# Patient Record
Sex: MALE
[De-identification: names, ages, dates, MRNs, and addresses within clinical notes are randomized per-mention and may not be internally consistent; named-entity substitution may affect disease eponyms.]

---

## 2023-03-21 PROBLEM — Z00.00 ENCOUNTER FOR PREVENTIVE HEALTH EXAMINATION: Status: ACTIVE | Noted: 2023-03-21

## 2023-03-22 ENCOUNTER — APPOINTMENT (OUTPATIENT)
Dept: UROLOGY | Facility: CLINIC | Age: 53
End: 2023-03-22
Payer: COMMERCIAL

## 2023-03-22 VITALS
BODY MASS INDEX: 26.01 KG/M2 | WEIGHT: 192 LBS | SYSTOLIC BLOOD PRESSURE: 127 MMHG | HEART RATE: 71 BPM | TEMPERATURE: 98.2 F | DIASTOLIC BLOOD PRESSURE: 72 MMHG | OXYGEN SATURATION: 97 % | HEIGHT: 72 IN

## 2023-03-22 DIAGNOSIS — Z80.42 FAMILY HISTORY OF MALIGNANT NEOPLASM OF PROSTATE: ICD-10-CM

## 2023-03-22 DIAGNOSIS — Z83.3 FAMILY HISTORY OF DIABETES MELLITUS: ICD-10-CM

## 2023-03-22 DIAGNOSIS — Z78.9 OTHER SPECIFIED HEALTH STATUS: ICD-10-CM

## 2023-03-22 DIAGNOSIS — N41.0 ACUTE PROSTATITIS: ICD-10-CM

## 2023-03-22 DIAGNOSIS — R39.9 UNSPECIFIED SYMPTOMS AND SIGNS INVOLVING THE GENITOURINARY SYSTEM: ICD-10-CM

## 2023-03-22 PROCEDURE — 51798 US URINE CAPACITY MEASURE: CPT

## 2023-03-22 PROCEDURE — 99204 OFFICE O/P NEW MOD 45 MIN: CPT

## 2023-03-22 RX ORDER — TAMSULOSIN HYDROCHLORIDE 0.4 MG/1
0.4 CAPSULE ORAL
Qty: 90 | Refills: 1 | Status: ACTIVE | COMMUNITY
Start: 2023-03-22 | End: 1900-01-01

## 2023-03-22 RX ORDER — CETIRIZINE HCL 5 MG
5 TABLET,CHEWABLE ORAL
Refills: 0 | Status: ACTIVE | COMMUNITY

## 2023-03-22 RX ORDER — SULFAMETHOXAZOLE AND TRIMETHOPRIM 800; 160 MG/1; MG/1
800-160 TABLET ORAL
Qty: 28 | Refills: 0 | Status: ACTIVE | COMMUNITY
Start: 2023-03-22 | End: 1900-01-01

## 2023-03-22 NOTE — HISTORY OF PRESENT ILLNESS
[FreeTextEntry1] : 52 yr old male presents with LUTS and prostate discomfort. States frequency, urgency, hesitancy, occasional straining, nocturia x2, weak stream for many years. Tried a "bladder spasm" med in the past which helped. Would like to restart. He denies dysuria, gross hematuria. \par \par More bothersome is a feeling of swollen prostate since August. There is perineal discomfort that is intermittent but some mild pain is always present. Not associated with dysuria. Had similar episode 12-15 years ago. Eventually resolved after antibiotics and time. Gets worse if he is stressed.\par \par Fluid intake: 3 cups coffee and water, stops drinking around 3-4 pm \par Of note, he has a family hx of prostate cancer (father).  His annual PSA was very low at 0.29.\par \par \par \par PSAs: 10/2022--0.29; \par UA: neg\par IPSS: 19, QOL: 4 \par PVR: 22 ml (LUTS)

## 2023-03-22 NOTE — ASSESSMENT
[FreeTextEntry1] : 52 yr old male presents with LUTS, and perineal discomfort\par \par Lower urinary symptoms were reviewed including the potential etiologies of the patient's symptoms. The anatomy of the urinary tract was reviewed. Bladder, prostate, and urethral sources, as well as non-urologic sources, were discussed. Options for evaluation were discussed including cystoscopy, urodynamics, and pelvis ultrasonography. Management of symptoms were reviewed including no therapy, medical therapy, and surgical therapy. The long term sequelae of no treatment, including no adverse effects, potential for progressive lower urinary tract symptoms or deterioration, urinary retention, bladder dysfunction, and renal dysfunction were reviewed. \par \par Medical therapy for BPH (benign prostatic hyperplasia), or prostate enlargement, was reviewed including the physiology of medication therapy. Alpha blocker medication therapy was reviewed including adverse effects (including dizziness, hypotension, retrograde ejaculation, rhinitis, fatigue), proper usage, and contraindications. \par \par We also discussed the possibility this is caused by pelvic floor dysfunction.  He has tried PFPT in the past and he feels this is something else. \par \par We will start a course of Bactrim for presumed prostatitis.  He is also interested in starting an alpha blocker for his baseline LUTS.  \par \par Plan:\par 1. Bactrim DS BID x 2 weeks\par 2. Tamsulosin 0.4 mg nightly\par 3. RTC after 2 weeks\par \par

## 2023-03-22 NOTE — PHYSICAL EXAM
[General Appearance - Well Developed] : well developed [General Appearance - Well Nourished] : well nourished [Normal Appearance] : normal appearance [Well Groomed] : well groomed [General Appearance - In No Acute Distress] : no acute distress [Edema] : no peripheral edema [Respiration, Rhythm And Depth] : normal respiratory rhythm and effort [Exaggerated Use Of Accessory Muscles For Inspiration] : no accessory muscle use [Abdomen Soft] : soft [Abdomen Tenderness] : non-tender [Costovertebral Angle Tenderness] : no ~M costovertebral angle tenderness [Normal Station and Gait] : the gait and station were normal for the patient's age [] : no rash [No Focal Deficits] : no focal deficits [Oriented To Time, Place, And Person] : oriented to person, place, and time [Affect] : the affect was normal [Mood] : the mood was normal [Not Anxious] : not anxious [Urethral Meatus] : meatus normal [Penis Abnormality] : normal circumcised penis [Epididymis] : the epididymides were normal [Testes Tenderness] : no tenderness of the testes [Prostate Enlargement] : the prostate was not enlarged [Testes Mass (___cm)] : there were no testicular masses [Prostate Tenderness] : the prostate was not tender [No Prostate Nodules] : no prostate nodules